# Patient Record
Sex: FEMALE | Race: WHITE | NOT HISPANIC OR LATINO | Employment: FULL TIME | URBAN - METROPOLITAN AREA
[De-identification: names, ages, dates, MRNs, and addresses within clinical notes are randomized per-mention and may not be internally consistent; named-entity substitution may affect disease eponyms.]

---

## 2017-07-05 ENCOUNTER — OFFICE VISIT (OUTPATIENT)
Dept: OBSTETRICS AND GYNECOLOGY | Facility: CLINIC | Age: 22
End: 2017-07-05
Payer: COMMERCIAL

## 2017-07-05 ENCOUNTER — LAB VISIT (OUTPATIENT)
Dept: LAB | Facility: HOSPITAL | Age: 22
End: 2017-07-05
Attending: OBSTETRICS & GYNECOLOGY
Payer: COMMERCIAL

## 2017-07-05 VITALS
BODY MASS INDEX: 41.32 KG/M2 | WEIGHT: 263.25 LBS | HEIGHT: 67 IN | SYSTOLIC BLOOD PRESSURE: 118 MMHG | DIASTOLIC BLOOD PRESSURE: 76 MMHG

## 2017-07-05 DIAGNOSIS — N83.201 RIGHT OVARIAN CYST: ICD-10-CM

## 2017-07-05 DIAGNOSIS — R10.30 LOWER ABDOMINAL PAIN: ICD-10-CM

## 2017-07-05 DIAGNOSIS — E66.01 OBESITY, CLASS III, BMI 40-49.9 (MORBID OBESITY): ICD-10-CM

## 2017-07-05 DIAGNOSIS — N93.0 PCB (POST COITAL BLEEDING): ICD-10-CM

## 2017-07-05 DIAGNOSIS — N73.9 PELVIC INFLAMMATORY DISEASE (PID): ICD-10-CM

## 2017-07-05 DIAGNOSIS — N92.0 POLYMENORRHEA: ICD-10-CM

## 2017-07-05 DIAGNOSIS — Z72.51 HIGH RISK SEXUAL BEHAVIOR: ICD-10-CM

## 2017-07-05 LAB
BASOPHILS # BLD AUTO: 0.02 K/UL
BASOPHILS NFR BLD: 0.3 %
C TRACH DNA SPEC QL NAA+PROBE: NOT DETECTED
DIFFERENTIAL METHOD: NORMAL
EOSINOPHIL # BLD AUTO: 0.3 K/UL
EOSINOPHIL NFR BLD: 3.8 %
ERYTHROCYTE [DISTWIDTH] IN BLOOD BY AUTOMATED COUNT: 12.5 %
HCT VFR BLD AUTO: 40.2 %
HGB BLD-MCNC: 13.6 G/DL
LYMPHOCYTES # BLD AUTO: 2.1 K/UL
LYMPHOCYTES NFR BLD: 29 %
MCH RBC QN AUTO: 29.3 PG
MCHC RBC AUTO-ENTMCNC: 33.8 %
MCV RBC AUTO: 87 FL
MONOCYTES # BLD AUTO: 0.5 K/UL
MONOCYTES NFR BLD: 6.7 %
N GONORRHOEA DNA SPEC QL NAA+PROBE: NOT DETECTED
NEUTROPHILS # BLD AUTO: 4.4 K/UL
NEUTROPHILS NFR BLD: 60.1 %
PLATELET # BLD AUTO: 293 K/UL
PMV BLD AUTO: 11.3 FL
RBC # BLD AUTO: 4.64 M/UL
TSH SERPL DL<=0.005 MIU/L-ACNC: 1.11 UIU/ML
WBC # BLD AUTO: 7.34 K/UL

## 2017-07-05 PROCEDURE — 85025 COMPLETE CBC W/AUTO DIFF WBC: CPT

## 2017-07-05 PROCEDURE — 84443 ASSAY THYROID STIM HORMONE: CPT

## 2017-07-05 PROCEDURE — 87591 N.GONORRHOEAE DNA AMP PROB: CPT

## 2017-07-05 PROCEDURE — 96372 THER/PROPH/DIAG INJ SC/IM: CPT | Mod: S$GLB,,, | Performed by: OBSTETRICS & GYNECOLOGY

## 2017-07-05 PROCEDURE — 36415 COLL VENOUS BLD VENIPUNCTURE: CPT | Mod: PO

## 2017-07-05 PROCEDURE — 99204 OFFICE O/P NEW MOD 45 MIN: CPT | Mod: 25,S$GLB,, | Performed by: OBSTETRICS & GYNECOLOGY

## 2017-07-05 PROCEDURE — 99999 PR PBB SHADOW E&M-NEW PATIENT-LVL III: CPT | Mod: PBBFAC,,, | Performed by: OBSTETRICS & GYNECOLOGY

## 2017-07-05 RX ORDER — LEVONORGESTREL / ETHINYL ESTRADIOL AND ETHINYL ESTRADIOL 150-30(84)
KIT ORAL
COMMUNITY
Start: 2017-04-14 | End: 2017-07-13 | Stop reason: SDUPTHER

## 2017-07-05 RX ORDER — CEFTRIAXONE 250 MG/1
250 INJECTION, POWDER, FOR SOLUTION INTRAMUSCULAR; INTRAVENOUS
Status: COMPLETED | OUTPATIENT
Start: 2017-07-05 | End: 2017-07-05

## 2017-07-05 RX ORDER — HYDROCODONE BITARTRATE AND ACETAMINOPHEN 10; 325 MG/1; MG/1
TABLET ORAL
Status: ON HOLD | COMMUNITY
Start: 2017-05-24 | End: 2017-08-28 | Stop reason: HOSPADM

## 2017-07-05 RX ORDER — DOXYCYCLINE 100 MG/1
100 CAPSULE ORAL 2 TIMES DAILY
Qty: 14 CAPSULE | Refills: 0 | Status: SHIPPED | OUTPATIENT
Start: 2017-07-05 | End: 2017-07-12

## 2017-07-05 RX ADMIN — CEFTRIAXONE 250 MG: 250 INJECTION, POWDER, FOR SOLUTION INTRAMUSCULAR; INTRAVENOUS at 09:07

## 2017-07-05 NOTE — PROGRESS NOTES
Rocephin 250mg given IM to right ventrogluteal.  Pt tolerated well.  Pt advised to wait 10-15 minutes for s/s of medication reaction.  (Pt stated she will wait while in lab for blood work.)  CHAR, LPN

## 2017-07-05 NOTE — PROGRESS NOTES
"Yadira Price is a 21 y.o.  who presents for   Chief Complaint   Patient presents with    Vaginal Bleeding     c/o bleeding since 2017, "nonstop," lower abdominal pain, states she was told at [facility] that her "endometriosis was causing a cyst on her ovary and would need her BC changed."   - on cont OC per her MD's rec and bleeding most days for past several months but only lite or discharge like.      + dysmenorrhea - variable occurrence (only w laying down, none in 2 wks - not regular and no clear ppt factors).   - has been having pelvic pain constantly (tyrone if not on her OC)    Pt is sexually active - NOT mut monog - uses OCPs for contraception.    - no sex since Feb - prior to Feb random sex w 7 partners - + hx of post coital bleeding (none in several months)    17 U/S at [facility]:    Nl uterus, 4.3x3.7 complex cystic R ovary    Maternal hx of Ovarian cancer at 32 and no other FH of ovarian cancer    - mom had DVT in her leg w 2ary "mild stroke" last yr (49 y/o) - she was an active lady w hx of varicose vv - Yadira really knows little about it and uncertain if she was put on any anticoag's ( rec she call and find out as it may impact her care and meds)    Past Medical History:   Diagnosis Date    Endometriosis        Past Surgical History:   Procedure Laterality Date    APPENDECTOMY         Current Outpatient Prescriptions   Medication Sig Dispense Refill    CAMRESE 0.15 mg-30 mcg (84)/10 mcg (7) 3MPk       hydrocodone-acetaminophen 10-325mg (NORCO)  mg Tab        No current facility-administered medications for this visit.           Review of patient's allergies indicates:   Allergen Reactions    Corticosteroids (glucocorticoids) Other (See Comments)     "fainted"       .  Family History   Problem Relation Age of Onset    Ovarian cancer Mother     Thrombosis Mother     Deep vein thrombosis Mother      DVT in legs    Breast cancer Neg Hx     Colon cancer Neg Hx      - mom's DVT (pt " "called her mom - suspect relationship not great) and sounds related to medical problems, immobility, wt    Social History   Substance Use Topics    Smoking status: Former Smoker     Packs/day: 0.25     Years: 1.00     Types: Cigarettes     Quit date: 6/26/2017    Smokeless tobacco: Never Used    Alcohol use Yes      Comment: socially       /76   Ht 5' 7" (1.702 m)   Wt 119.4 kg (263 lb 3.7 oz)   LMP  (LMP Unknown)   BMI 41.23 kg/m²     ROS:  GENERAL: No other general complaints.   BREASTS: No lumps, tenderness, discharge.  GI: No nausea, vomiting, melena, hematochezia.  : No dysuria, hematuria. No significant urinary incontinence.  GYN: No vasomotor symptoms.    GEN:  Alert and oriented lady in no acute distress.  HEAD and NECK: Normocephalic. Normal thyroid to inspection and palpation  SKIN: No significant hirsutism or acne                ABDOMEN: Obese, soft and tender on LLQ. No guarding or rebound. No palp mass, hepatomegaly, RUQT or CVAT.   PELVIC:      Vulva: normal genitalia. No lesions. No inguinal adenopathy.      Urethra: normal size and location. No lesion, prolapse, or discharge. Non tender.      Vagina: Moist, no unusual discharge, but + menstrual bleeding. No cystocele or rectocele      Cervix: Normal appearance. Free of discharge or lesion.  Pap deferred but gen probe done      Uterus: Difficult exam but feels upper nl size, min tender. + cervical motion tenderness.           Bladder base is non tender.      Adnexa: No masses or CDS nodularity. Tender in L adnexal area      Anus: normal appearing.    IMPRESSION: concerned about PID, obesity, high risk SA, ? FH thrombophilia, hx R ovarian cyst, post coital bleeding      COUNSELING:  - Long talk re hi risk SA and risks not only of STD's (some of which have no cure) and can increase risks of cancer and infertility  - pos causes of post coital bleeding including cx dz  - Discussed pt's weight and the health implications (increased risks of " thrombosis, DM, HTN, renal disease, lipid issues) -  recommend Weight Watchers, OLOL, or any other organized program) along with increased activity/exercise (30-60 min 5 times/wk). Suggest a target of 5-10 % wt reduction over 6-12 months as a goal. Pt states over past yr she has loss 150 lb since working at [employer] and need to walk to work and everywhere else most of the time. Encouraged cont efforts  - Concerned re OC and rec depo or other non estrogen method    PLAN: cbc, pap, gen probe, TSH, U/S, f/u in 2 wks ( or sooner if any pain or fever) for results and to decide on possible LARC's, Rocephin 250 IM and vibramicin now. Consider pap if not bleeding at next visit

## 2017-07-10 ENCOUNTER — TELEPHONE (OUTPATIENT)
Dept: RADIOLOGY | Facility: HOSPITAL | Age: 22
End: 2017-07-10

## 2017-07-13 ENCOUNTER — PATIENT MESSAGE (OUTPATIENT)
Dept: OBSTETRICS AND GYNECOLOGY | Facility: CLINIC | Age: 22
End: 2017-07-13

## 2017-07-13 DIAGNOSIS — Z30.41 ENCOUNTER FOR SURVEILLANCE OF CONTRACEPTIVE PILLS: Primary | ICD-10-CM

## 2017-07-13 RX ORDER — LEVONORGESTREL / ETHINYL ESTRADIOL AND ETHINYL ESTRADIOL 150-30(84)
KIT ORAL
Qty: 28 EACH | Refills: 1 | Status: SHIPPED | OUTPATIENT
Start: 2017-07-13

## 2017-07-17 ENCOUNTER — TELEPHONE (OUTPATIENT)
Dept: RADIOLOGY | Facility: HOSPITAL | Age: 22
End: 2017-07-17

## 2017-07-18 ENCOUNTER — TELEPHONE (OUTPATIENT)
Dept: OBSTETRICS AND GYNECOLOGY | Facility: CLINIC | Age: 22
End: 2017-07-18

## 2017-07-18 ENCOUNTER — HOSPITAL ENCOUNTER (OUTPATIENT)
Dept: RADIOLOGY | Facility: HOSPITAL | Age: 22
Discharge: HOME | End: 2017-07-18
Attending: OBSTETRICS & GYNECOLOGY
Payer: COMMERCIAL

## 2017-07-18 DIAGNOSIS — E66.01 OBESITY, CLASS III, BMI 40-49.9 (MORBID OBESITY): ICD-10-CM

## 2017-07-18 DIAGNOSIS — Z80.41 FAMILY HISTORY OF OVARIAN CANCER: ICD-10-CM

## 2017-07-18 DIAGNOSIS — N83.201 RIGHT OVARIAN CYST: ICD-10-CM

## 2017-07-18 DIAGNOSIS — R10.30 LOWER ABDOMINAL PAIN: ICD-10-CM

## 2017-07-18 DIAGNOSIS — N83.201 RIGHT OVARIAN CYST: Primary | ICD-10-CM

## 2017-07-18 PROCEDURE — 76856 US EXAM PELVIC COMPLETE: CPT | Mod: TC,PO

## 2017-07-18 PROCEDURE — 76830 TRANSVAGINAL US NON-OB: CPT | Mod: 26,,, | Performed by: RADIOLOGY

## 2017-07-18 PROCEDURE — 76856 US EXAM PELVIC COMPLETE: CPT | Mod: 26,,, | Performed by: RADIOLOGY

## 2017-07-18 NOTE — TELEPHONE ENCOUNTER
----- Message from Prem Johnson MD sent at 7/18/2017 12:50 PM CDT -----  Let her know she still has the ovarian cyst and needs blood work before next visit (order already placed)

## 2017-07-19 NOTE — TELEPHONE ENCOUNTER
Pt informed and appt made for lab at [facility] location on 7/28/17, per pt request.  Pt voiced understanding.  YIN PARDO

## 2017-07-28 ENCOUNTER — LAB VISIT (OUTPATIENT)
Dept: LAB | Facility: HOSPITAL | Age: 22
End: 2017-07-28
Attending: OBSTETRICS & GYNECOLOGY
Payer: COMMERCIAL

## 2017-07-28 ENCOUNTER — PATIENT MESSAGE (OUTPATIENT)
Dept: OBSTETRICS AND GYNECOLOGY | Facility: CLINIC | Age: 22
End: 2017-07-28

## 2017-07-28 DIAGNOSIS — N83.201 RIGHT OVARIAN CYST: ICD-10-CM

## 2017-07-28 DIAGNOSIS — Z80.41 FAMILY HISTORY OF OVARIAN CANCER: ICD-10-CM

## 2017-07-28 LAB — CANCER AG125 SERPL-ACNC: 18 U/ML

## 2017-07-28 PROCEDURE — 36415 COLL VENOUS BLD VENIPUNCTURE: CPT | Mod: PO

## 2017-07-28 PROCEDURE — 86304 IMMUNOASSAY TUMOR CA 125: CPT

## 2017-08-02 ENCOUNTER — TELEPHONE (OUTPATIENT)
Dept: OBSTETRICS AND GYNECOLOGY | Facility: CLINIC | Age: 22
End: 2017-08-02

## 2017-08-02 ENCOUNTER — OFFICE VISIT (OUTPATIENT)
Dept: OBSTETRICS AND GYNECOLOGY | Facility: CLINIC | Age: 22
End: 2017-08-02
Payer: COMMERCIAL

## 2017-08-02 VITALS
DIASTOLIC BLOOD PRESSURE: 68 MMHG | SYSTOLIC BLOOD PRESSURE: 134 MMHG | HEIGHT: 67 IN | WEIGHT: 266.75 LBS | BODY MASS INDEX: 41.87 KG/M2

## 2017-08-02 DIAGNOSIS — N83.209 OVARIAN CYST: Primary | ICD-10-CM

## 2017-08-02 DIAGNOSIS — D27.0 DERMOID CYST OF RIGHT OVARY: Primary | ICD-10-CM

## 2017-08-02 DIAGNOSIS — Z30.09 CONTRACEPTIVE EDUCATION: ICD-10-CM

## 2017-08-02 PROBLEM — N83.201 RIGHT OVARIAN CYST: Status: RESOLVED | Noted: 2017-07-05 | Resolved: 2017-08-02

## 2017-08-02 PROCEDURE — 99213 OFFICE O/P EST LOW 20 MIN: CPT | Mod: S$GLB,,, | Performed by: OBSTETRICS & GYNECOLOGY

## 2017-08-02 PROCEDURE — 99999 PR PBB SHADOW E&M-EST. PATIENT-LVL III: CPT | Mod: PBBFAC,,, | Performed by: OBSTETRICS & GYNECOLOGY

## 2017-08-02 NOTE — PROGRESS NOTES
Yadira Price is a 21 y.o. obese  w high risk sex activity and a hx of pelvic pain and irreg bleeding which is better on OC's and a maternal hx of ovarian cancer at 32 and a subsequent DVT after a CVA at 46   - at last visit pt tx'ed for possible PID w Rocephin and vibramycin - feeling much better w no pain or bleeding    LMP: 3 wks ago but no dysmen    17 U/S at [facility]:    Nl uterus, 4.3x3.7 complex cystic R ovary    --------------------------------------------------------------    Ca 125 = 18  CBC, TSH, and Gen Probe unremarkable    17 U/S:  Findings: Transabdominal and transvaginal pelvic ultrasound performed.  The uterus measures 7.1 cm in length and 2.7 x 3.6 cm in transverse dimensions.  The endometrium is normal thickness at 3.0 mm.   No discrete uterine fibroids identified.    The right ovary measures 2.5 x 3.3 x 2.0cm.  The left ovary measures 2.0 x 2.5 x 1.6cm. There is a 3.9 x 3.1 x 3.9 cm lesion adjacent to the right ovary that is mildly echogenic appears to demonstrate some sound through-transmission but demonstrates significant internal blood flow.  This lesion has apparently been worked up at outside institution and has been followed.  If this is thought to represent a dermoid cyst the internal blood flow is quite concerning and degeneration into a malignant lesion cannot be excluded.  Correlation with outside studies is recommended.  MRI may also be helpful as clinically warranted.  No free fluid.   Impression     1.  Large slightly echogenic solid appearing lesion adjacent to the right ovary with measurements given above demonstrating prominent internal blood flow.  Please see discussion above     - pt had U/S done a yr ago and had a 2+ cm R ovarian mass but no real f/u     pt hx of mult partners and previously discussed mult risks of this practice again      Past Medical History:   Diagnosis Date    Endometriosis        Past Surgical History:   Procedure Laterality Date     "APPENDECTOMY         Current Outpatient Prescriptions   Medication Sig Dispense Refill    CAMRESE 0.15 mg-30 mcg (84)/10 mcg (7) 3MPk Take 1 pill daily 28 each 1    hydrocodone-acetaminophen 10-325mg (NORCO)  mg Tab        No current facility-administered medications for this visit.           Review of patient's allergies indicates:   Allergen Reactions    Corticosteroids (glucocorticoids) Other (See Comments)     "fainted"       .  Family History   Problem Relation Age of Onset    Ovarian cancer Mother     Thrombosis Mother     Deep vein thrombosis Mother      DVT in legs    Breast cancer Neg Hx     Colon cancer Neg Hx        Social History   Substance Use Topics    Smoking status: Former Smoker     Packs/day: 0.25     Years: 1.00     Types: Cigarettes     Quit date: 6/26/2017    Smokeless tobacco: Never Used    Alcohol use Yes      Comment: socially       LMP  (LMP Unknown)     ROS:  GENERAL: No acute complaints.     GEN:  Happy, alert and oriented lady in no distress.      IMPRESSION: enlarging symptomatic R adnexal mass - probable dermoid per imaging   - obesity   - maternal hx of ovarian cancer and thrombophilia      COUNSELING: in light of symptoms, worrisome imaging, and mat hx rec surgical eval and pos cystectomy (possilbe R oophorectomy)    - pt concerned OC related to wt gain and want's to discuss Mirena - talk again re pro/cons, her fears based on other stories and what she reads and asked her to also consider Nexplanon       - discussed potential benefit of OC w ovarian cyst and her concern re endometriois and rec we cont for now    PLAN: RALS cystectomy and possible oophorectomy     - pt request hysterectomy for her long standing persistest endometriosis pain - no desire for children ever and would like to adopt if ever desired - long talk re age and don't rec   "

## 2017-08-08 ENCOUNTER — TELEPHONE (OUTPATIENT)
Dept: OBSTETRICS AND GYNECOLOGY | Facility: CLINIC | Age: 22
End: 2017-08-08

## 2017-08-08 ENCOUNTER — PATIENT MESSAGE (OUTPATIENT)
Dept: OBSTETRICS AND GYNECOLOGY | Facility: CLINIC | Age: 22
End: 2017-08-08

## 2017-08-08 NOTE — TELEPHONE ENCOUNTER
----- Message from Jeremy Davila sent at 8/8/2017  1:16 PM CDT -----  The pt will like to have her surgery on 8/28/17.  The pt can be reached at [phone].

## 2017-08-09 ENCOUNTER — TELEPHONE (OUTPATIENT)
Dept: OBSTETRICS AND GYNECOLOGY | Facility: CLINIC | Age: 22
End: 2017-08-09

## 2017-08-09 NOTE — TELEPHONE ENCOUNTER
----- Message from Byron Avila sent at 8/9/2017 10:17 AM CDT -----  Contact: Alethea Claire called to verify surgery date and diagnosis callback number is [phone] leave a message if no answer.

## 2017-08-10 ENCOUNTER — TELEPHONE (OUTPATIENT)
Dept: OBSTETRICS AND GYNECOLOGY | Facility: CLINIC | Age: 22
End: 2017-08-10

## 2017-08-10 NOTE — TELEPHONE ENCOUNTER
Spoke with chelly, she had received the paperwork and therefore did not need the return to work date because it was provided on the paperwork

## 2017-08-10 NOTE — TELEPHONE ENCOUNTER
----- Message from Jania Murray sent at 8/9/2017  2:54 PM CDT -----  Contact: Sancho/Jillian  Please dimitris [phone] regarding pt upcoming surgery, need to know return day for work.

## 2017-08-25 ENCOUNTER — OFFICE VISIT (OUTPATIENT)
Dept: OBSTETRICS AND GYNECOLOGY | Facility: CLINIC | Age: 22
End: 2017-08-25
Payer: COMMERCIAL

## 2017-08-25 ENCOUNTER — HOSPITAL ENCOUNTER (OUTPATIENT)
Dept: PREADMISSION TESTING | Facility: HOSPITAL | Age: 22
Discharge: HOME | End: 2017-08-25
Attending: UROLOGY
Payer: COMMERCIAL

## 2017-08-25 VITALS — BODY MASS INDEX: 41.28 KG/M2 | HEIGHT: 67 IN | WEIGHT: 263 LBS

## 2017-08-25 VITALS
DIASTOLIC BLOOD PRESSURE: 78 MMHG | HEIGHT: 67 IN | BODY MASS INDEX: 41.32 KG/M2 | WEIGHT: 263.25 LBS | SYSTOLIC BLOOD PRESSURE: 118 MMHG

## 2017-08-25 DIAGNOSIS — D36.9 DERMOID: ICD-10-CM

## 2017-08-25 DIAGNOSIS — D27.0 DERMOID CYST OF OVARY, RIGHT: Primary | ICD-10-CM

## 2017-08-25 PROCEDURE — 99999 PR PBB SHADOW E&M-EST. PATIENT-LVL II: CPT | Mod: PBBFAC,,, | Performed by: OBSTETRICS & GYNECOLOGY

## 2017-08-25 PROCEDURE — 99499 UNLISTED E&M SERVICE: CPT | Mod: S$GLB,,, | Performed by: OBSTETRICS & GYNECOLOGY

## 2017-08-25 RX ORDER — SODIUM CHLORIDE, SODIUM LACTATE, POTASSIUM CHLORIDE, CALCIUM CHLORIDE 600; 310; 30; 20 MG/100ML; MG/100ML; MG/100ML; MG/100ML
INJECTION, SOLUTION INTRAVENOUS CONTINUOUS
Status: CANCELLED | OUTPATIENT
Start: 2017-08-25

## 2017-08-25 RX ORDER — ACETAMINOPHEN 10 MG/ML
1000 INJECTION, SOLUTION INTRAVENOUS
Status: CANCELLED | OUTPATIENT
Start: 2017-08-25 | End: 2017-08-25

## 2017-08-25 NOTE — H&P
"  PRE OPERATIVE EXAM   Yadira Price is a 22 y.o.  with a persistent symptomatic complex R ovarian cystic mass admitted for a RALS right ovarian cystectomy and possible R oophorectomy.           Past Medical History:   Diagnosis Date    Endometriosis                 Past Surgical History:   Procedure Laterality Date    APPENDECTOMY             Current Medications          Current Outpatient Prescriptions   Medication Sig Dispense Refill    CAMRESE 0.15 mg-30 mcg (84)/10 mcg (7) 3MPk Take 1 pill daily 28 each 1    hydrocodone-acetaminophen 10-325mg (NORCO)  mg Tab            No current facility-administered medications for this visit.                   Review of patient's allergies indicates:   Allergen Reactions    Corticosteroids (glucocorticoids) Other (See Comments)       "fainted"                Family History   Problem Relation Age of Onset    Ovarian cancer Mother      Thrombosis Mother      Deep vein thrombosis Mother         DVT in legs    Breast cancer Neg Hx      Colon cancer Neg Hx                   Social History   Substance Use Topics    Smoking status: Former Smoker       Packs/day: 0.25       Years: 1.00       Types: Cigarettes       Quit date: 2017    Smokeless tobacco: Never Used    Alcohol use Yes          Comment: socially      VS: 119.4kg   118/78     ROS:   No acute complaints or fever.    No chest pain, dyspnea   No nausea, vomiting, melena, hematochezia.   No significant incontinence, dysuria, hematuria.   No unusual discharge     GEN NAD  HEENT nl thyroid  HRT RRR  LUNGS Clear bilaterally  ABD Obese, soft s organomegaly, min tender in RLQ. No CVAT/RUQT  PELVIC nl vulva, uterus upper normal size s CMT, no palpable ovary on either side but min tender on R.  EXT No significant edema or tenderness, no cords           Lab Results   Component Value Date     WBC 7.34 2017     HGB 13.6 2017     HCT 40.2 2017      2017     TSH 1.109 " 07/05/2017       = 18          Results for orders placed during the hospital encounter of 07/18/17   US Pelvis Comp with Transvag NON-OB (xpd)     Narrative Comparison: None     Findings: Transabdominal and transvaginal pelvic ultrasound performed.  The uterus measures 7.1 cm in length and 2.7 x 3.6 cm in transverse dimensions.  The endometrium is normal thickness at 3.0 mm.   No discrete uterine fibroids identified.    The right ovary measures 2.5 x 3.3 x 2.0cm.  The left ovary measures 2.0 x 2.5 x 1.6cm. There is a 3.9 x 3.1 x 3.9 cm lesion adjacent to the right ovary that is mildly echogenic appears to demonstrate some sound through-transmission but demonstrates significant internal blood flow.  This lesion has apparently been worked up at outside institution and has been followed.  If this is thought to represent a dermoid cyst the internal blood flow is quite concerning and degeneration into a malignant lesion cannot be excluded.  Correlation with outside studies is recommended.  MRI may also be helpful as clinically warranted.  No free fluid.     Impression 1.  Large slightly echogenic solid appearing lesion adjacent to the right ovary with measurements given above demonstrating prominent internal blood flow.  Please see discussion above.     Date:                                                                                        07/18/17      IMP: Persistent symptomatic complex R ovarian cystic mass    PLAN: RALS R ovarian cystectomy, possible R oophorectomy

## 2017-08-25 NOTE — PROGRESS NOTES
"PRE OPERATIVE EXAM    Yadira Price is a 22 y.o.  with a persistent symptomatic complex R ovarian cystic mass admitted for a RALS right ovarian cystectomy and possible R oophorectomy who presents today for her preoperative evaluation.      Past Medical History:   Diagnosis Date    Endometriosis        Past Surgical History:   Procedure Laterality Date    APPENDECTOMY         Current Outpatient Prescriptions   Medication Sig Dispense Refill    CAMRESE 0.15 mg-30 mcg (84)/10 mcg (7) 3MPk Take 1 pill daily 28 each 1    hydrocodone-acetaminophen 10-325mg (NORCO)  mg Tab        No current facility-administered medications for this visit.         Review of patient's allergies indicates:   Allergen Reactions    Corticosteroids (glucocorticoids) Other (See Comments)     "fainted"       Family History   Problem Relation Age of Onset    Ovarian cancer Mother     Thrombosis Mother     Deep vein thrombosis Mother      DVT in legs    Breast cancer Neg Hx     Colon cancer Neg Hx        Social History   Substance Use Topics    Smoking status: Former Smoker     Packs/day: 0.25     Years: 1.00     Types: Cigarettes     Quit date: 2017    Smokeless tobacco: Never Used    Alcohol use Yes      Comment: socially     VS: 119.4kg   118/78    ROS:   No acute complaints or fever.    No chest pain, dyspnea   No nausea, vomiting, melena, hematochezia.   No significant incontinence, dysuria, hematuria.   No unusual discharge      GEN NAD  HEENT nl thyroid  HRT RRR  LUNGS Clear bilaterally  ABD Obese, soft s organomegaly, min tender in RLQ. No CVAT/RUQT  PELVIC nl vulva, uterus upper normal size s CMT, no palpable ovary on either side but min tender on R.  EXT No significant edema or tenderness, no cords    Lab Results   Component Value Date    WBC 7.34 2017    HGB 13.6 2017    HCT 40.2 2017     2017    TSH 1.109 2017      = 18    Results for orders placed during the " hospital encounter of 07/18/17   US Pelvis Comp with Transvag NON-OB (xpd)    Narrative Comparison: None    Findings: Transabdominal and transvaginal pelvic ultrasound performed.  The uterus measures 7.1 cm in length and 2.7 x 3.6 cm in transverse dimensions.  The endometrium is normal thickness at 3.0 mm.   No discrete uterine fibroids identified.    The right ovary measures 2.5 x 3.3 x 2.0cm.  The left ovary measures 2.0 x 2.5 x 1.6cm. There is a 3.9 x 3.1 x 3.9 cm lesion adjacent to the right ovary that is mildly echogenic appears to demonstrate some sound through-transmission but demonstrates significant internal blood flow.  This lesion has apparently been worked up at outside institution and has been followed.  If this is thought to represent a dermoid cyst the internal blood flow is quite concerning and degeneration into a malignant lesion cannot be excluded.  Correlation with outside studies is recommended.  MRI may also be helpful as clinically warranted.  No free fluid.    Impression 1.  Large slightly echogenic solid appearing lesion adjacent to the right ovary with measurements given above demonstrating prominent internal blood flow.  Please see discussion above.    Date:     07/18/17       IMP: Persistent symptomatic complex R ovarian cystic mass    PLAN: RALS R ovarian cystectomy, possible R oophorectomy    I have explained the risks, benefits, and alternatives of the procedure in detail. The patient voices understanding and all questions have been answered. The patient agrees to proceed as planned.  Procedure specific consent form has been reviewed with the patient and signed.    FOLLOW UP: After hospitalization or as needed.

## 2017-08-26 ENCOUNTER — ANESTHESIA EVENT (OUTPATIENT)
Dept: SURGERY | Facility: HOSPITAL | Age: 22
End: 2017-08-26
Payer: COMMERCIAL

## 2017-08-28 ENCOUNTER — HOSPITAL ENCOUNTER (OUTPATIENT)
Facility: HOSPITAL | Age: 22
Discharge: HOME | End: 2017-08-28
Attending: OBSTETRICS & GYNECOLOGY | Admitting: OBSTETRICS & GYNECOLOGY
Payer: COMMERCIAL

## 2017-08-28 ENCOUNTER — ANESTHESIA (OUTPATIENT)
Dept: SURGERY | Facility: HOSPITAL | Age: 22
End: 2017-08-28
Payer: COMMERCIAL

## 2017-08-28 DIAGNOSIS — G89.18 POST-OP PAIN: Primary | ICD-10-CM

## 2017-08-28 DIAGNOSIS — D27.0 DERMOID CYST OF OVARY, RIGHT: ICD-10-CM

## 2017-08-28 DIAGNOSIS — D36.9 DERMOID: ICD-10-CM

## 2017-08-28 PROCEDURE — C2628 CATHETER, OCCLUSION: HCPCS | Performed by: OBSTETRICS & GYNECOLOGY

## 2017-08-28 PROCEDURE — 27201423 OPTIME MED/SURG SUP & DEVICES STERILE SUPPLY: Performed by: OBSTETRICS & GYNECOLOGY

## 2017-08-28 PROCEDURE — 37000008 HC ANESTHESIA 1ST 15 MINUTES: Performed by: OBSTETRICS & GYNECOLOGY

## 2017-08-28 PROCEDURE — 36000712 HC OR TIME LEV V 1ST 15 MIN: Performed by: OBSTETRICS & GYNECOLOGY

## 2017-08-28 PROCEDURE — 58662 LAPAROSCOPY EXCISE LESIONS: CPT | Mod: ,,, | Performed by: OBSTETRICS & GYNECOLOGY

## 2017-08-28 PROCEDURE — 88305 TISSUE EXAM BY PATHOLOGIST: CPT | Performed by: PATHOLOGY

## 2017-08-28 PROCEDURE — 25000003 PHARM REV CODE 250: Performed by: OBSTETRICS & GYNECOLOGY

## 2017-08-28 PROCEDURE — 63600175 PHARM REV CODE 636 W HCPCS: Performed by: ANESTHESIOLOGY

## 2017-08-28 PROCEDURE — 25000003 PHARM REV CODE 250: Performed by: ANESTHESIOLOGY

## 2017-08-28 PROCEDURE — 36000713 HC OR TIME LEV V EA ADD 15 MIN: Performed by: OBSTETRICS & GYNECOLOGY

## 2017-08-28 PROCEDURE — 71000015 HC POSTOP RECOV 1ST HR: Performed by: OBSTETRICS & GYNECOLOGY

## 2017-08-28 PROCEDURE — 37000009 HC ANESTHESIA EA ADD 15 MINS: Performed by: OBSTETRICS & GYNECOLOGY

## 2017-08-28 PROCEDURE — 58662 LAPAROSCOPY EXCISE LESIONS: CPT | Mod: AS,,,

## 2017-08-28 PROCEDURE — 63600175 PHARM REV CODE 636 W HCPCS: Performed by: NURSE ANESTHETIST, CERTIFIED REGISTERED

## 2017-08-28 PROCEDURE — 71000033 HC RECOVERY, INTIAL HOUR: Performed by: OBSTETRICS & GYNECOLOGY

## 2017-08-28 PROCEDURE — 25000003 PHARM REV CODE 250: Performed by: NURSE ANESTHETIST, CERTIFIED REGISTERED

## 2017-08-28 PROCEDURE — 71000016 HC POSTOP RECOV ADDL HR: Performed by: OBSTETRICS & GYNECOLOGY

## 2017-08-28 PROCEDURE — 63600175 PHARM REV CODE 636 W HCPCS: Performed by: OBSTETRICS & GYNECOLOGY

## 2017-08-28 PROCEDURE — 88305 TISSUE EXAM BY PATHOLOGIST: CPT | Mod: 26,,, | Performed by: PATHOLOGY

## 2017-08-28 RX ORDER — LIDOCAINE HYDROCHLORIDE 10 MG/ML
1 INJECTION, SOLUTION EPIDURAL; INFILTRATION; INTRACAUDAL; PERINEURAL ONCE
Status: DISCONTINUED | OUTPATIENT
Start: 2017-08-28 | End: 2017-08-28 | Stop reason: HOSPADM

## 2017-08-28 RX ORDER — ONDANSETRON 8 MG/1
8 TABLET, ORALLY DISINTEGRATING ORAL EVERY 8 HOURS PRN
Status: DISCONTINUED | OUTPATIENT
Start: 2017-08-28 | End: 2017-08-28 | Stop reason: HOSPADM

## 2017-08-28 RX ORDER — FENTANYL CITRATE 50 UG/ML
INJECTION, SOLUTION INTRAMUSCULAR; INTRAVENOUS
Status: DISCONTINUED | OUTPATIENT
Start: 2017-08-28 | End: 2017-08-28

## 2017-08-28 RX ORDER — SODIUM CHLORIDE 0.9 % (FLUSH) 0.9 %
3 SYRINGE (ML) INJECTION
Status: DISCONTINUED | OUTPATIENT
Start: 2017-08-28 | End: 2017-08-28 | Stop reason: HOSPADM

## 2017-08-28 RX ORDER — DIPHENHYDRAMINE HYDROCHLORIDE 50 MG/ML
25 INJECTION INTRAMUSCULAR; INTRAVENOUS EVERY 4 HOURS PRN
Status: DISCONTINUED | OUTPATIENT
Start: 2017-08-28 | End: 2017-08-28 | Stop reason: HOSPADM

## 2017-08-28 RX ORDER — DIPHENHYDRAMINE HYDROCHLORIDE 50 MG/ML
25 INJECTION INTRAMUSCULAR; INTRAVENOUS EVERY 6 HOURS PRN
Status: DISCONTINUED | OUTPATIENT
Start: 2017-08-28 | End: 2017-08-28 | Stop reason: SDUPTHER

## 2017-08-28 RX ORDER — OXYCODONE HYDROCHLORIDE 5 MG/1
10 TABLET ORAL EVERY 4 HOURS PRN
Status: DISCONTINUED | OUTPATIENT
Start: 2017-08-28 | End: 2017-08-28 | Stop reason: HOSPADM

## 2017-08-28 RX ORDER — NEOSTIGMINE METHYLSULFATE 1 MG/ML
INJECTION, SOLUTION INTRAVENOUS
Status: DISCONTINUED | OUTPATIENT
Start: 2017-08-28 | End: 2017-08-28

## 2017-08-28 RX ORDER — MEPERIDINE HYDROCHLORIDE 50 MG/ML
12.5 INJECTION INTRAMUSCULAR; INTRAVENOUS; SUBCUTANEOUS ONCE AS NEEDED
Status: COMPLETED | OUTPATIENT
Start: 2017-08-28 | End: 2017-08-28

## 2017-08-28 RX ORDER — SODIUM CHLORIDE, SODIUM LACTATE, POTASSIUM CHLORIDE, CALCIUM CHLORIDE 600; 310; 30; 20 MG/100ML; MG/100ML; MG/100ML; MG/100ML
INJECTION, SOLUTION INTRAVENOUS CONTINUOUS
Status: DISCONTINUED | OUTPATIENT
Start: 2017-08-28 | End: 2017-08-28 | Stop reason: HOSPADM

## 2017-08-28 RX ORDER — ONDANSETRON 2 MG/ML
INJECTION INTRAMUSCULAR; INTRAVENOUS
Status: DISCONTINUED | OUTPATIENT
Start: 2017-08-28 | End: 2017-08-28

## 2017-08-28 RX ORDER — PROPOFOL 10 MG/ML
VIAL (ML) INTRAVENOUS
Status: DISCONTINUED | OUTPATIENT
Start: 2017-08-28 | End: 2017-08-28

## 2017-08-28 RX ORDER — ONDANSETRON 2 MG/ML
4 INJECTION INTRAMUSCULAR; INTRAVENOUS DAILY PRN
Status: DISCONTINUED | OUTPATIENT
Start: 2017-08-28 | End: 2017-08-28 | Stop reason: HOSPADM

## 2017-08-28 RX ORDER — MIDAZOLAM HYDROCHLORIDE 1 MG/ML
INJECTION, SOLUTION INTRAMUSCULAR; INTRAVENOUS
Status: DISCONTINUED | OUTPATIENT
Start: 2017-08-28 | End: 2017-08-28

## 2017-08-28 RX ORDER — HYDROMORPHONE HYDROCHLORIDE 2 MG/ML
0.2 INJECTION, SOLUTION INTRAMUSCULAR; INTRAVENOUS; SUBCUTANEOUS EVERY 5 MIN PRN
Status: DISCONTINUED | OUTPATIENT
Start: 2017-08-28 | End: 2017-08-28 | Stop reason: HOSPADM

## 2017-08-28 RX ORDER — ACETAMINOPHEN 10 MG/ML
1000 INJECTION, SOLUTION INTRAVENOUS
Status: COMPLETED | OUTPATIENT
Start: 2017-08-28 | End: 2017-08-28

## 2017-08-28 RX ORDER — DIPHENHYDRAMINE HCL 25 MG
25 CAPSULE ORAL EVERY 4 HOURS PRN
Status: DISCONTINUED | OUTPATIENT
Start: 2017-08-28 | End: 2017-08-28 | Stop reason: HOSPADM

## 2017-08-28 RX ORDER — HYDROCODONE BITARTRATE AND ACETAMINOPHEN 5; 325 MG/1; MG/1
1 TABLET ORAL EVERY 4 HOURS PRN
Status: DISCONTINUED | OUTPATIENT
Start: 2017-08-28 | End: 2017-08-28 | Stop reason: HOSPADM

## 2017-08-28 RX ORDER — GLYCOPYRROLATE 0.2 MG/ML
INJECTION INTRAMUSCULAR; INTRAVENOUS
Status: DISCONTINUED | OUTPATIENT
Start: 2017-08-28 | End: 2017-08-28

## 2017-08-28 RX ORDER — ROCURONIUM BROMIDE 10 MG/ML
INJECTION, SOLUTION INTRAVENOUS
Status: DISCONTINUED | OUTPATIENT
Start: 2017-08-28 | End: 2017-08-28

## 2017-08-28 RX ORDER — ACETAMINOPHEN AND CODEINE PHOSPHATE 300; 30 MG/1; MG/1
TABLET ORAL
Qty: 12 TABLET | Refills: 0 | Status: SHIPPED | OUTPATIENT
Start: 2017-08-28 | End: 2017-09-26

## 2017-08-28 RX ORDER — LIDOCAINE HYDROCHLORIDE 20 MG/ML
INJECTION, SOLUTION EPIDURAL; INFILTRATION; INTRACAUDAL; PERINEURAL
Status: DISCONTINUED | OUTPATIENT
Start: 2017-08-28 | End: 2017-08-28

## 2017-08-28 RX ORDER — SODIUM CHLORIDE, SODIUM LACTATE, POTASSIUM CHLORIDE, CALCIUM CHLORIDE 600; 310; 30; 20 MG/100ML; MG/100ML; MG/100ML; MG/100ML
INJECTION, SOLUTION INTRAVENOUS CONTINUOUS
Status: DISCONTINUED | OUTPATIENT
Start: 2017-08-28 | End: 2017-08-28 | Stop reason: SDUPTHER

## 2017-08-28 RX ORDER — MORPHINE SULFATE 10 MG/ML
2 INJECTION INTRAMUSCULAR; INTRAVENOUS; SUBCUTANEOUS EVERY 5 MIN PRN
Status: DISCONTINUED | OUTPATIENT
Start: 2017-08-28 | End: 2017-08-28 | Stop reason: HOSPADM

## 2017-08-28 RX ADMIN — PROPOFOL 160 MG: 10 INJECTION, EMULSION INTRAVENOUS at 10:08

## 2017-08-28 RX ADMIN — ROCURONIUM BROMIDE 50 MG: 10 INJECTION, SOLUTION INTRAVENOUS at 10:08

## 2017-08-28 RX ADMIN — ONDANSETRON 4 MG: 2 INJECTION, SOLUTION INTRAMUSCULAR; INTRAVENOUS at 11:08

## 2017-08-28 RX ADMIN — FENTANYL CITRATE 50 MCG: 50 INJECTION, SOLUTION INTRAMUSCULAR; INTRAVENOUS at 10:08

## 2017-08-28 RX ADMIN — ROBINUL 0.8 MG: 0.2 INJECTION INTRAMUSCULAR; INTRAVENOUS at 11:08

## 2017-08-28 RX ADMIN — FENTANYL CITRATE 50 MCG: 50 INJECTION, SOLUTION INTRAMUSCULAR; INTRAVENOUS at 12:08

## 2017-08-28 RX ADMIN — OXYCODONE HYDROCHLORIDE 10 MG: 5 TABLET ORAL at 01:08

## 2017-08-28 RX ADMIN — LIDOCAINE HYDROCHLORIDE 100 MG: 20 INJECTION, SOLUTION EPIDURAL; INFILTRATION; INTRACAUDAL; PERINEURAL at 10:08

## 2017-08-28 RX ADMIN — MEPERIDINE HYDROCHLORIDE 12.5 MG: 50 INJECTION INTRAMUSCULAR; INTRAVENOUS; SUBCUTANEOUS at 01:08

## 2017-08-28 RX ADMIN — FENTANYL CITRATE 50 MCG: 50 INJECTION, SOLUTION INTRAMUSCULAR; INTRAVENOUS at 11:08

## 2017-08-28 RX ADMIN — ACETAMINOPHEN 1000 MG: 10 INJECTION, SOLUTION INTRAVENOUS at 12:08

## 2017-08-28 RX ADMIN — NEOSTIGMINE METHYLSULFATE 5 MG: 1 INJECTION INTRAVENOUS at 11:08

## 2017-08-28 RX ADMIN — MORPHINE SULFATE 2 MG: 10 INJECTION, SOLUTION INTRAMUSCULAR; INTRAVENOUS at 01:08

## 2017-08-28 RX ADMIN — ROCURONIUM BROMIDE 10 MG: 10 INJECTION, SOLUTION INTRAVENOUS at 10:08

## 2017-08-28 RX ADMIN — MIDAZOLAM HYDROCHLORIDE 2 MG: 1 INJECTION, SOLUTION INTRAMUSCULAR; INTRAVENOUS at 10:08

## 2017-08-28 RX ADMIN — SODIUM CHLORIDE, SODIUM LACTATE, POTASSIUM CHLORIDE, AND CALCIUM CHLORIDE: 600; 310; 30; 20 INJECTION, SOLUTION INTRAVENOUS at 10:08

## 2017-08-28 RX ADMIN — PROMETHAZINE HYDROCHLORIDE 12.5 MG: 25 INJECTION INTRAMUSCULAR; INTRAVENOUS at 01:08

## 2017-08-28 RX ADMIN — FENTANYL CITRATE 100 MCG: 50 INJECTION, SOLUTION INTRAMUSCULAR; INTRAVENOUS at 10:08

## 2017-08-28 NOTE — ANESTHESIA POSTPROCEDURE EVALUATION
"Anesthesia Post Evaluation    Patient: Yadiar Price    Procedure(s) Performed: Procedure(s) (LRB):  ROBOTIC ASSISTED LAPAROSCOPIC CYSTECTOMY- RIGHT ROUND LIGAMENT TUMOR (Right)  SALPINGECTOMY - PARTIAL RIGHT (Right)    Final Anesthesia Type: general  Patient location during evaluation: PACU  Patient participation: Yes- Able to Participate  Level of consciousness: awake  Post-procedure vital signs: reviewed and stable  Airway patency: patent  PONV status at discharge: No PONV  Anesthetic complications: no      Cardiovascular status: stable  Respiratory status: spontaneous ventilation  Hydration status: euvolemic  Follow-up not needed.        Visit Vitals  /63   Pulse 76   Temp 37 °C (98.6 °F) (Temporal)   Resp 12   Ht 5' 7" (1.702 m)   Wt 117.8 kg (259 lb 11.2 oz)   LMP 08/22/2017 (Exact Date)   SpO2 95%   Breastfeeding? No   BMI 40.68 kg/m²       Pain/Larry Score: Pain Assessment Performed: Yes (8/28/2017  2:15 PM)  Presence of Pain: complains of pain/discomfort (8/28/2017  2:15 PM)  Pain Rating Prior to Med Admin: 8 (8/28/2017  1:57 PM)  Larry Score: 10 (8/28/2017  2:15 PM)      "

## 2017-08-28 NOTE — OP NOTE
DATE OF PROCEDURE:  08/28/2017    PREOPERATIVE DIAGNOSIS:  Right ovarian dermoid.    POSTOPERATIVE DIAGNOSES:  Predominantly solid-appearing tumor originating   primarily from the right round ligament with some involvement of the right mid   tube diffuse filmy pelvic adhesions, normal uterus, normal left adnexa, normal   appearing right ovary, which was somewhat adhered to the pelvic sidewall.    PROCEDURE:  Robotically assisted laparoscopic pelvic adhesiolysis and excision   of right round ligament/right mid tubal mass.    SURGEON:  Prem Johnson M.D.    ASSISTANT:  Maryann Godinez.    PROCEDURE IN DETAIL:  The patient was brought to the Operating Room, put on   table in supine position after achieving adequate level of general anesthesia.    Bimanual exam was performed confirming the preoperative findings.  Castillo   catheter was sterilely inserted and a MEHDI manipulator was placed.  Legs were   lowered.  Abdomen was prepped and draped in the usual fashion.  After a timeout,   towel clips were placed periumbilically and Veress needle was inserted through   the umbilicus into the peritoneal cavity without difficulty.  Correct placement   was confirmed with a drop of water test.  Peritoneal cavity was then insufflated   with carbon dioxide.  Veress needle was removed.  An 8 mm incision made in the   upper pole of the umbilicus through which an 8 mm trocar was placed.    Laparoscope was inserted through the sheath confirming correct placement.    Inspection of the upper abdomen was unremarkable.  The pelvis was remarkable for   upper normal size uterus completely normal-appearing left adnexa.  The right   adnexa was remarkable for a solid-appearing muscular tumor, which seemed to   arise predominantly from the right round ligament; however, the mid tubal   segment was also involved in the tumor.  There were diffuse pelvic adhesions.    Once this was accomplished, two 8 mm ports were then placed bilaterally at the    level of the umbilicus and another 8 mm port was placed in the left upper   quadrant under direct visualization without complication.  The patient was   docked to the robotic surgical system from the right side of the patient   Trendelenburg position, which allowed for good visualization of the   pelvis, which was as previously described.  Sharp dissection allowed for lysis   of the majority of the filmy adhesions.  Bipolar cautery was used to coagulate   the round ligament, proximal and distal to the tumor mass.  Round ligament was   then incised and in a similar fashion, the bipolar was used to coagulate the   involved tubal segment, which was then excised, freeing the mass completely.  An   EndoCatch bag was then placed through the left upper quadrant port and the   tumor mass placed in it, gas was allowed to escape, slowly reinsufflated several   times, confirming complete hemostasis.  The EndoCatch bag was then brought out   through the left upper quadrant incision, which had to be extended to roughly   3.5 cm.  The mass was then sent to Pathology for permanent section that left   upper quadrant incision was closed using a suture assist in an interrupted   fashion.  The remaining ports were removed.  Hemostasis was assured.  Skin was   closed with 3-0 Vicryl subcuticular and Steri-Strips.  No operative   complication.    ESTIMATED BLOOD LOSS:  10 mL.      MSD/HN  dd: 08/28/2017 12:17:44 (CDT)  td: 08/28/2017 15:26:13 (CDT)  Doc ID   #[id number]  Job ID #[id number]    CC:

## 2017-08-28 NOTE — INTERVAL H&P NOTE
The patient has been examined and the H&P has been reviewed:    I concur with the findings and no changes have occurred since H&P was written.    Anesthesia/Surgery risks, benefits and alternative options discussed and understood by patient/family.          Active Hospital Problems    Diagnosis  POA    Dermoid [D36.9]  Yes      Resolved Hospital Problems    Diagnosis Date Resolved POA   No resolved problems to display.

## 2017-08-28 NOTE — DISCHARGE INSTRUCTIONS
Treatment for Ovarian Cysts  An ovarian cyst is a fluid-filled sac that forms on or inside an ovary. The ovaries are a pair of small, oval-shaped organs in the lower part of a womans belly (abdomen). About once a month, one of the ovaries releases an egg. The ovaries also make the hormones estrogen and progesterone. These hormones are part of pregnancy, the menstrual cycle, and breast growth.  Ovarian cysts are very common in women of all ages. Young girls can also get them, but this is less common. There are different types of ovarian cysts. They can occur for various reasons, and they may need different treatments. A cyst can vary in size from half an inch to more than 4 inches.  Types of treatment  Treatment for an ovarian cyst will depend on the type of cyst, your age, and your general health. Most women will not need treatment. You may be told to watch your symptoms over time. An ovarian cyst will often go away with no treatment in a few weeks or months.  In some cases, you may need to have follow-up ultrasound tests. These are to check if your cyst has gone away or is not growing. You may not need any other treatment.  If your ultrasound or blood tests show signs of cancer, your doctor may advise surgery. This is done to remove part or all of your ovary. Your doctor might also advise surgery if:  · Your cyst causes ongoing pressure or pain  · Your cyst appears to be growing  · You have a very large cyst  · You have endometriosis and want the cyst removed to help with fertility  Can an ovarian cyst be prevented?  If you have hormone problems, your doctor may advise taking birth control pills. These may help prevent ovarian cysts. Taking antibiotics for a pelvic infection may also prevent a cyst.  Possible complications of an ovarian cyst  An ovarian cyst can sometimes break open (rupture). This may not cause any symptoms. Or it may cause sudden, sharp pain in the lower belly. A ruptured cyst can cause a  lot of blood and fluid loss. This can lead to low blood pressure. In some cases, surgery may be needed.  Rarely an ovarian cyst can also cause twisting (torsion) of the fallopian tube. This can block normal blood supply to the ovary. This can lead to sudden pain and may need emergency surgery.  When to call the healthcare provider  Call your healthcare provider right away if you have any of these:  · Sudden belly pain  · Other severe symptoms   Date Last Reviewed: 8/10/2015  © 8848-5221 [employer]. [address] [place] [address], [place] [address]. All rights reserved. This information is not intended as a substitute for professional medical care. Always follow your healthcare professional's instructions.          General Information:    1.  Do not drink alcoholic beverages including beer for 24 hours or as long as you are on pain medication..  2.  Do not drive a motor vehicle, operate machinery or power tools, or signs legal papers for 24 hours or as long as you are on pain medication.   3.  You may experience light-headedness, dizziness, and sleepiness following surgery. Please do not stay alone. A responsible adult should be with you for this 24 hour period.  4.  Go home and rest.  5. Progress slowly to a normal diet unless instructed.  Otherwise, begin with liquids such as soft drinks, then soup and crackers working up to solid foods. Drink plenty of nonalcoholic fluids.  6.  Certain anesthetics and pain medications produce nausea and vomiting in certain       individuals. If nausea becomes a problem at home, call you doctor.  7. Several times every hour while you are awake, take 2-3 deep breaths and cough. If you had stomach surgery hold a pillow or rolled towel firmly against your stomach before you cough. This will help with any pain the cough might cause.  8. Several times every hour while you are awake, pump and flex your feet 5-6 times and do foot circles. This will help prevent blood clots.  9.Call  your doctor for severe pain, bleeding, fever, or signs or symptoms of infection (pain, swelling, redness, foul odor, drainage).  10.You can contact your doctor anytime by [phone] for the [facility] (at [facility]) or [phone] for the [facility] on [facility].   [email or web address] is another way to contact your doctor if you are an active participant online with [facility].        Acetaminophen; Codeine tablets   What is this medicine?  ACETAMINOPHEN; CODEINE (a set a CÉSAR richardson fen; KOE kari) is a pain reliever. It is used to treat mild to moderate pain.  How should I use this medicine?  Take this medicine by mouth with a full glass of water. Follow the directions on the prescription label. You can take it with or without food. If if upsets your stomach, take the medicine with food. Do not take your medicine more often than directed.  A special MedGuide will be given to you by the pharmacist with each prescription and refill. Be sure to read this information carefully each time.  Talk to your pediatrician regarding the use of this medicine in children. Special care may be needed.  What side effects may I notice from receiving this medicine?  Side effects that you should report to your doctor or health care professional as soon as possible:  · allergic reactions like skin rash, itching or hives, swelling of the face, lips, or tongue  · breathing problems  · confusion  · redness, blistering, peeling or loosening of the skin, including inside the mouth  · signs and symptoms of low blood pressure like dizziness; feeling faint or lightheaded, falls; unusually weak or tired  · trouble passing urine or change in the amount of urine  · yellowing of the eyes or skin  Side effects that usually do not require medical attention (report to your doctor or health care professional if they continue or are bothersome):  · constipation  · dry mouth  · nausea, vomiting  · tiredness  What may interact with  this medicine?  This medicine may interact with the following medications:  · alcohol  · antihistamines for allergy, cough and cold  · antiviral medicines used for HIV or AIDS  · atropine  · certain antibiotics like erythromycin and clarithromycin  · certain medicines for anxiety or sleep  · certain medicines for bladder problems like oxybutynin, tolterodine  · certain medicines for depression like amitriptyline, fluoxetine, sertraline  · certain medicines for fungal infections like ketoconazole and itraconazole  · certain medicines for irregular heart beat like amiodarone, propafenone, quinidine  · certain medicines for Parkinson's disease like benztropine, trihexyphenidyl  · certain medicines for seizures like carbamazepine, phenobarbital, phenytoin, primidone  · certain medicines for stomach problems like dicyclomine, hyoscyamine  · certain medicines for travel sickness like scopolamine  · general anesthetics like halothane, isoflurane, methoxyflurane, propofol  · ipratropium  · local anesthetics like lidocaine, pramoxine, tetracaine  · MAOIs like Carbex, Eldepryl, Marplan, Nardil, and Parnate  · medicines that relax muscles for surgery  · other medicines with acetaminophen  · other narcotic medicines for pain or cough  · phenothiazines like chlorpromazine, mesoridazine, prochlorperazine, thioridazine  · rifampin  What if I miss a dose?  If you miss a dose, take it as soon as you can. If it is almost time for your next dose, take only that dose. Do not take double or extra doses.  Where should I keep my medicine?  Keep out of the reach of children. This medicine can be abused. Keep your medicine in a safe place to protect it from theft. Do not share this medicine with anyone. Selling or giving away this medicine is dangerous and against the law.  This medicine may cause accidental overdose and death if it taken by other adults, children, or pets. Mix any unused medicine with a substance like cat litter or coffee  grounds. Then throw the medicine away in a sealed container like a sealed bag or a coffee can with a lid. Do not use the medicine after the expiration date.  Store at room temperature between 15 and 30 degrees C (59 and 86 degrees F).  What should I tell my health care provider before I take this medicine?  They need to know if you have any of these conditions:  · brain tumor  · Crohn's disease, inflammatory bowel disease, or ulcerative colitis  · drug abuse or addiction  · head injury  · heart or circulation problems  · if you often drink alcohol  · kidney disease or problems going to the bathroom  · liver disease  · lung disease, asthma, or breathing problems  · an unusual or allergic reaction to acetaminophen, codeine, salicylates, other opioid analgesics, other medicines, foods, dyes, or preservatives  · pregnant or trying to get pregnant  · breast-feeding  What should I watch for while using this medicine?  Tell your doctor or health care professional if your pain does not go away, if it gets worse, or if you have new or a different type of pain. You may develop tolerance to the medication. Tolerance means that you will need a higher dose of the medication for pain relief. Tolerance is normal and is expected if you take the medicine for a long time.  Do not suddenly stop taking your medicine because you may develop a severe reaction. Your body becomes used to the medicine. This does NOT mean you are addicted. Addiction is a behavior related to getting and using a drug for a non medical reason. If you have pain, you have a medical reason to take pain medicine. Your doctor will tell you how much medicine to take. If your doctor wants you to stop the medicine, the dose will be slowly lowered over time to avoid any side effects.  There are different types of narcotic medicines (opiates). If you take more than one type at the same time or if you are taking another medicine that also causes drowsiness, you may have  more side effects. Give your health care provider a list of all medicines you use. Your doctor will tell you how much medicine to take. Do not take more medicine than directed. Call emergency for help if you have problems breathing or unusual sleepiness.  Do not take other medicines that contain acetaminophen with this medicine. Always read labels carefully. If you have questions, ask your doctor or pharmacist.  If you take too much acetaminophen get medical help right away. Too much acetaminophen can be very dangerous and cause liver damage. Even if you do not have symptoms, it is important to get help right away  You may get drowsy or dizzy. Do not drive, use machinery, or do anything that needs mental alertness until you know how this medicine affects you. Do not stand or sit up quickly, especially if you are an older patient. This reduces the risk of dizzy or fainting spells. Alcohol may interfere with the effect of this medicine. Avoid alcoholic drinks.  The medicine will cause constipation. Try to have a bowel movement at least every 2 to 3 days. If you do not have a bowel movement for 3 days, call your doctor or health care professional.  Your mouth may get dry. Chewing sugarless gum or sucking hard candy, and drinking plenty of water may help. Contact your doctor if the problem does not go away or is severe.  Immediately call your physician or get emergency help if you are breast-feeding and your baby is sleepier than usual, is limp, or has difficulty breastfeeding or breathing.  Children may be at higher risk for side effects. If your child has slow breathing, noisy breathing, confusion, or unusual sleepiness, stop giving this medicine and get medical help right away.  Date Last Reviewed:   NOTE:This sheet is a summary. It may not cover all possible information. If you have questions about this medicine, talk to your doctor, pharmacist, or health care provider. Copyright© 2016 [employer]

## 2017-08-28 NOTE — ANESTHESIA PREPROCEDURE EVALUATION
08/28/2017  Yadira Price is a 22 y.o., female.    Anesthesia Evaluation    I have reviewed the Patient Summary Reports.    I have reviewed the Nursing Notes.      Review of Systems  Anesthesia Hx:  No problems with previous Anesthesia Denies Hx of Anesthetic complications  History of prior surgery of interest to airway management or planning: Previous anesthesia: General Denies Family Hx of Anesthesia complications.   Denies Personal Hx of Anesthesia complications.   Social:  Former Smoker, No Alcohol Use    Hematology/Oncology:  Hematology Normal   Oncology Normal     EENT/Dental:EENT/Dental Normal   Cardiovascular:  Cardiovascular Normal     Pulmonary:  Pulmonary Normal    Renal/:  Renal/ Normal     Hepatic/GI:  Hepatic/GI Normal    Musculoskeletal:  Musculoskeletal Normal    Neurological:   Seizures    Endocrine:  Endocrine Normal    Dermatological:  Skin Normal    Psych:  Psychiatric Normal           Physical Exam  General:  Well nourished, Obesity    Airway/Jaw/Neck:  Airway Findings: Mouth Opening: Normal Tongue: Normal  General Airway Assessment: Adult  Mallampati: I  TM Distance: Normal, at least 6 cm  Jaw/Neck Findings:     Neck ROM: Normal ROM      Dental:  Dental Findings: In tact   Chest/Lungs:  Chest/Lungs Findings: Clear to auscultation, Normal Respiratory Rate     Heart/Vascular:  Heart Findings: Rate: Normal  Rhythm: Regular Rhythm  Sounds: Normal        Mental Status:  Mental Status Findings:  Cooperative, Alert and Oriented, Anxious         Anesthesia Plan  Type of Anesthesia, risks & benefits discussed:  Anesthesia Type:  general  Patient's Preference:   Intra-op Monitoring Plan: standard ASA monitors  Intra-op Monitoring Plan Comments:   Post Op Pain Control Plan:   Post Op Pain Control Plan Comments:   Induction:   IV  Beta Blocker:  Patient is not currently on a Beta-Blocker (No  further documentation required).       Informed Consent: Patient understands risks and agrees with Anesthesia plan.  Questions answered. Anesthesia consent signed with patient.  ASA Score: 2     Day of Surgery Review of History & Physical: I have interviewed and examined the patient. I have reviewed the patient's H&P dated: 8-28-17.           Ready For Surgery From Anesthesia Perspective.

## 2017-08-28 NOTE — TRANSFER OF CARE
"Anesthesia Transfer of Care Note    Patient: Yadira Price    Procedure(s) Performed: Procedure(s) (LRB):  ROBOTIC ASSISTED LAPAROSCOPIC CYSTECTOMY- RIGHT ROUND LIGAMENT TUMOR (Right)  SALPINGECTOMY - PARTIAL RIGHT (Right)    Patient location: PACU    Anesthesia Type: general    Transport from OR: Transported from OR on room air with adequate spontaneous ventilation    Post pain: adequate analgesia    Post assessment: no apparent anesthetic complications    Post vital signs: stable    Level of consciousness: awake    Nausea/Vomiting: no nausea/vomiting    Complications: none    Transfer of care protocol was followed      Last vitals:   Visit Vitals  BP (!) 166/81 (BP Location: Right arm, Patient Position: Sitting)   Pulse 92   Temp 37.1 °C (98.8 °F) (Temporal)   Resp 18   Ht 5' 7" (1.702 m)   Wt 117.8 kg (259 lb 11.2 oz)   LMP 08/22/2017 (Exact Date)   SpO2 97%   Breastfeeding? No   BMI 40.68 kg/m²     "

## 2017-08-28 NOTE — DISCHARGE SUMMARY
DISCHARGE SUMMARY    DATE OF ADMISSION: 2017    DATE OF DISCHARGE:     ATTENDING PHYSICIAN: Prem Johnson MD    ADMISSION DIAGNOSIS: Ovarian cyst [N83.209]  Dermoid [D36.9]    DISCHARGE DIAGNOSIS: R round ligament tumor    Active Hospital Problems    Diagnosis  POA    *Dermoid [D36.9]  Yes      Resolved Hospital Problems    Diagnosis Date Resolved POA   No resolved problems to display.         HOSPITAL COURSE:  Yadira Price is a 22 y.o. female  with an adnexal mass felt to be a dermoid based on pre-op imaging admitted for RALS right ovarian cystectomy or possible R oophorectomy . Her surgery and postoperative course were unremarkable and as she is in stable condition, she is allowed to go home on a regular diet and pelvic rest with limited activity. She will continue any prior home medications. Follow-up visit in 4 weeks. She knows to call for any problem or concern.    Current Discharge Medication List      START taking these medications    Details   acetaminophen-codeine 300-30mg (TYLENOL #3) 300-30 mg Tab Take 1 tablet every 8 hrs prn pain.  Qty: 12 tablet, Refills: 0    Associated Diagnoses: Post-op pain         CONTINUE these medications which have NOT CHANGED    Details   CAMRESE 0.15 mg-30 mcg (84)/10 mcg (7) 3MPk Take 1 pill daily  Qty: 28 each, Refills: 1    Associated Diagnoses: Encounter for surveillance of contraceptive pills         STOP taking these medications       hydrocodone-acetaminophen 10-325mg (NORCO)  mg Tab Comments:   Reason for Stopping:

## 2017-08-28 NOTE — BRIEF OP NOTE
BRIEF OPERATIVE NOTE       SUMMARY      Surgery Date: 08/28/2017     SURGEON: Prem Johnson    ASSISTANT: Lashaun ROCHA     Active Hospital Problems    Diagnosis  POA    Dermoid [D36.9]  Yes      Resolved Hospital Problems    Diagnosis Date Resolved POA   No resolved problems to display.         PREOP DIAGNOSIS: R ovarian dermoid    POSTOP DIAGNOSIS:  R round ligament/R mid tubal tumor, diffuse filmy pelvic adhesions, normal uterus and L adnexa    PROCEDURES: RALS excision of firm mass which seems to arise primarily from the R round ligament and adjacent R mid tube    ANESTHESIA: general    FINDINGS/KEY COMPONENTS: as above    ESTIMATED BLOOD LOSS: 10 cc    COMPLICATIONS: none    SPECIMEN: R round ligament tumor

## 2017-08-29 ENCOUNTER — PATIENT MESSAGE (OUTPATIENT)
Dept: OBSTETRICS AND GYNECOLOGY | Facility: CLINIC | Age: 22
End: 2017-08-29

## 2017-08-29 VITALS
HEIGHT: 67 IN | TEMPERATURE: 99 F | DIASTOLIC BLOOD PRESSURE: 63 MMHG | OXYGEN SATURATION: 95 % | WEIGHT: 259.69 LBS | SYSTOLIC BLOOD PRESSURE: 121 MMHG | BODY MASS INDEX: 40.76 KG/M2 | RESPIRATION RATE: 12 BRPM | HEART RATE: 76 BPM

## 2017-09-01 ENCOUNTER — PATIENT MESSAGE (OUTPATIENT)
Dept: OBSTETRICS AND GYNECOLOGY | Facility: CLINIC | Age: 22
End: 2017-09-01

## 2017-09-05 ENCOUNTER — NURSE TRIAGE (OUTPATIENT)
Dept: ADMINISTRATIVE | Facility: CLINIC | Age: 22
End: 2017-09-05

## 2017-09-06 NOTE — TELEPHONE ENCOUNTER
"  Reason for Disposition   Caller has NON-URGENT question and triager unable to answer question    Answer Assessment - Initial Assessment Questions  1. SYMPTOM: "What's the main symptom you're concerned about?" (e.g., redness, pain, drainage)      Bleeding from incision site  2. ONSET: "When did ________  start?"      Just noted this pm  3. SURGERY: "What surgery was performed?"      Had lap cystectomy  4. DATE of SURGERY: "When was surgery performed?"       8/25/17  5. INCISION SITE: "Where is the incision located?"       Affected incision is on right side of abdomen. Incision is about an inch long  6. REDNESS: "Is there any redness at the incision site?" If yes, ask: "How wide across is the redness?" (Inches, centimeters)       no  7. PAIN: "Is there any pain?" If so, ask: "How bad is it?"  (Scale 1-10; or mild, moderate, severe)      no  8. BLEEDING: "Is there any bleeding?" If so, ask: "How much?" and "Where?"      Just noted there was some bloody drainage from wound. Appears to be bleeding under the glue and at end glue appears to be peeling back a little  9. DRAINAGE: "Is there any drainage from the incision site?" If yes, ask: "What color and how much?" (e.g., red, cloudy, pus; drops, teaspoon)     Small amount  10. FEVER: "Do you have a fever?" If so, ask: "What is your temperature, how was it measured, and when did it start?"        n/a  11. OTHER SYMPTOMS: "Do you have any other symptoms?" (e.g., shaking chills, weakness, rash elsewhere on body)        None reported    Protocols used: ST POST-OP INCISION SYMPTOMS-A-AH    "

## 2017-09-26 ENCOUNTER — OFFICE VISIT (OUTPATIENT)
Dept: OBSTETRICS AND GYNECOLOGY | Facility: CLINIC | Age: 22
End: 2017-09-26
Payer: COMMERCIAL

## 2017-09-26 VITALS
DIASTOLIC BLOOD PRESSURE: 76 MMHG | BODY MASS INDEX: 41.46 KG/M2 | WEIGHT: 264.13 LBS | SYSTOLIC BLOOD PRESSURE: 132 MMHG | HEIGHT: 67 IN

## 2017-09-26 DIAGNOSIS — N80.30 ENDOMETRIOSIS, PELVIC PERITONEUM: Primary | ICD-10-CM

## 2017-09-26 PROCEDURE — 99024 POSTOP FOLLOW-UP VISIT: CPT | Mod: S$GLB,,, | Performed by: OBSTETRICS & GYNECOLOGY

## 2017-09-26 PROCEDURE — 3008F BODY MASS INDEX DOCD: CPT | Mod: S$GLB,,, | Performed by: OBSTETRICS & GYNECOLOGY

## 2017-09-26 PROCEDURE — 99999 PR PBB SHADOW E&M-EST. PATIENT-LVL II: CPT | Mod: PBBFAC,,, | Performed by: OBSTETRICS & GYNECOLOGY

## 2017-09-26 NOTE — PROGRESS NOTES
"Yadira Price is a 22 y.o. G0 female who presents for post-op follow-up.  She is s/p Robotically assisted laparoscopic pelvic adhesiolysis and excision of right round ligament/right mid tubal mass on 8/28/17.  Maternal hx of ovarian cancer  Doing well w no complaint. No GI or  issues.    Past Medical History:   Diagnosis Date    Endometriosis     Seizures     As a child Last seizure 16 yrs ago       Path: FINAL PATHOLOGIC DIAGNOSIS  Right ovarian cyst:  Leiomyoma with endometriosis/adenomyosis;  Ovarian parenchyma is not identified.    /76   Ht 5' 7" (1.702 m)   Wt 119.8 kg (264 lb 1.8 oz)   LMP 09/25/2017 (Exact Date)   BMI 41.37 kg/m²     GEN: Pleasant lady in no acute distress. Alert and oriented.  ABDOMEN: Flat, soft and nontender. Incisions well healed R sided . No hernia. No CVAT.  PELVIC: declined    Doing well post op - pt w mult concerns and ?'s re endometriosis and mx/px etc    Long talk re endometriosis and implications, etc and various tx options: observation, OC's, depo-mpa   - will start on Ovcon and if doing well f/u for annual - if any interval issues RTC    "

## (undated) DEVICE — DRAPE LAVH LAPAROSCOPY W/FLUID

## (undated) DEVICE — TROCAR ENDOPATH XCEL 12X100MM

## (undated) DEVICE — DEVICE CLOSURE DISP 14G

## (undated) DEVICE — SEE MEDLINE ITEM 154981

## (undated) DEVICE — KIT ROBOTIC 3 ARM DA VINCI SI

## (undated) DEVICE — BAG TISS RETRV MONARCH 10MM

## (undated) DEVICE — SUT VICRYL ANTIMICRO VIL BR

## (undated) DEVICE — TROCAR ENDOPATH XCEL 5X100MM

## (undated) DEVICE — CORD LAP 10 DISP

## (undated) DEVICE — SYR 50ML CATH TIP

## (undated) DEVICE — KIT ANTIFOG

## (undated) DEVICE — SOL ELECTROLUBE ANTI-STIC

## (undated) DEVICE — APPLICATOR CHLORAPREP ORN 26ML

## (undated) DEVICE — TIP RUMI BLUE DISPOSABLE 5/BX

## (undated) DEVICE — SEE MEDLINE ITEM 152739

## (undated) DEVICE — SYR 3CC LUER LOC

## (undated) DEVICE — SUT ABS CLIP LAPRA-TY CTD

## (undated) DEVICE — Device

## (undated) DEVICE — DRAPE STERI LONG

## (undated) DEVICE — COVER TIP CURVED SCISSORS XI

## (undated) DEVICE — SYR 50CC LL

## (undated) DEVICE — SUT CTD VICRYL 0 UND BR SUT

## (undated) DEVICE — ITEM DISCONTINUED

## (undated) DEVICE — ADHESIVE DERMABOND ADVANCED

## (undated) DEVICE — WARMER DRAPE STERILE LF

## (undated) DEVICE — MANIFOLD 4 PORT

## (undated) DEVICE — SEE MEDLINE ITEM 157117

## (undated) DEVICE — COVER OVERHEAD SURG LT BLUE

## (undated) DEVICE — SYR 10CC LUER LOCK

## (undated) DEVICE — TUBING HEATED INSUFFLATOR

## (undated) DEVICE — ELECTRODE REM PLYHSV RETURN 9

## (undated) DEVICE — SEE MEDLINE ITEM 146420

## (undated) DEVICE — SOL WATER STRL IRR 1000ML

## (undated) DEVICE — OCCLUDER COLPO-PNEUMO STERILE

## (undated) DEVICE — SUT CTD VICRYL PLUS 4/0

## (undated) DEVICE — SEE MEDLINE ITEM 157181

## (undated) DEVICE — SOL NS 1000CC

## (undated) DEVICE — NDL PNEUMO INSUFFLATI 120MM

## (undated) DEVICE — SEE MEDLINE ITEM 157027

## (undated) DEVICE — TROCAR ENDOPATH XCEL 12MM 10CM

## (undated) DEVICE — IRRIGATOR ENDOSCOPY DISP.

## (undated) DEVICE — SEE MEDLINE ITEM 146292

## (undated) DEVICE — OBTURATOR 8MM BLADELESS